# Patient Record
Sex: FEMALE | Race: BLACK OR AFRICAN AMERICAN | Employment: FULL TIME | URBAN - METROPOLITAN AREA
[De-identification: names, ages, dates, MRNs, and addresses within clinical notes are randomized per-mention and may not be internally consistent; named-entity substitution may affect disease eponyms.]

---

## 2022-11-08 ENCOUNTER — HOSPITAL ENCOUNTER (EMERGENCY)
Age: 32
Discharge: HOME | End: 2022-11-08
Attending: EMERGENCY MEDICINE
Payer: MEDICAID

## 2022-11-08 VITALS
DIASTOLIC BLOOD PRESSURE: 87 MMHG | BODY MASS INDEX: 28.95 KG/M2 | SYSTOLIC BLOOD PRESSURE: 127 MMHG | HEART RATE: 79 BPM | TEMPERATURE: 97.3 F | HEIGHT: 63 IN | OXYGEN SATURATION: 100 % | WEIGHT: 163.36 LBS | RESPIRATION RATE: 17 BRPM
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
## 2022-11-08 DIAGNOSIS — M25.561 CHRONIC PAIN OF RIGHT KNEE: Primary | ICD-10-CM

## 2022-11-08 DIAGNOSIS — G89.29 CHRONIC PAIN OF RIGHT KNEE: Primary | ICD-10-CM

## 2022-11-08 PROCEDURE — 99283 EMERGENCY DEPT VISIT LOW MDM: CPT

## 2022-11-08 RX ORDER — IBUPROFEN 600 MG/1
600 TABLET ORAL
Qty: 20 TABLET | Refills: 0 | Status: SHIPPED | OUTPATIENT
Start: 2022-11-08

## 2022-11-08 NOTE — LETTER
[facility] EMERGENCY DEPT  [address] [id number] [phone]    Work/School Note    Date: 11/8/2022    To Whom It May concern:    Dawit White was seen and treated today in the emergency room by the following provider(s):  Physician Assistant: Myron Madden may return to school tomorrow so long as fever free.     Sincerely,          LIZETH Scott

## 2022-11-08 NOTE — LETTER
[facility] EMERGENCY DEPT  [address] [facility] [address] [phone]    Work/School Note    Date: 11/8/2022    To Whom It May concern:    Marry Crystal was seen and treated today in the emergency room by the following provider(s):  Attending Provider: Jose Nava MD  Physician Assistant: Cheryle Ward may return to work on [id number].     Sincerely,          LIZETH Pittman
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
